# Patient Record
Sex: FEMALE | Race: WHITE | URBAN - METROPOLITAN AREA
[De-identification: names, ages, dates, MRNs, and addresses within clinical notes are randomized per-mention and may not be internally consistent; named-entity substitution may affect disease eponyms.]

---

## 2024-10-16 ENCOUNTER — OFFICE VISIT (OUTPATIENT)
Dept: URGENT CARE | Age: 54
End: 2024-10-16
Payer: COMMERCIAL

## 2024-10-16 VITALS
HEIGHT: 66 IN | DIASTOLIC BLOOD PRESSURE: 94 MMHG | TEMPERATURE: 98.1 F | OXYGEN SATURATION: 99 % | SYSTOLIC BLOOD PRESSURE: 139 MMHG | WEIGHT: 165 LBS | HEART RATE: 62 BPM | BODY MASS INDEX: 26.52 KG/M2 | RESPIRATION RATE: 16 BRPM

## 2024-10-16 DIAGNOSIS — S63.601A SPRAIN OF RIGHT THUMB, UNSPECIFIED SITE OF DIGIT, INITIAL ENCOUNTER: Primary | ICD-10-CM

## 2024-10-16 RX ORDER — METHYLPREDNISOLONE ACETATE 80 MG/ML
80 INJECTION, SUSPENSION INTRA-ARTICULAR; INTRALESIONAL; INTRAMUSCULAR; SOFT TISSUE ONCE
Status: COMPLETED | OUTPATIENT
Start: 2024-10-16 | End: 2024-10-16

## 2024-10-16 NOTE — PATIENT INSTRUCTIONS
You received a anti-inflammatory steroid injection here in the office.  Ice can be used for pain relief by placing it on the area pain for 15-20 minutes, 2-3 times per day.  Range of motion stretches and exercises.    If symptoms not improving within the next week, follow-up for further testing and possible x-ray.

## 2024-10-16 NOTE — PROGRESS NOTES
HPI  Patient was at work using a grinding wheel that was going upwards, thumb was pushing an object into it and twisted off.  Felt sore the rest the day yesterday.  Took ibuprofen and resting it.  No swelling or bruising.  Able to move it well.  No significant pain.  No similar injury in the past.  Right-hand-dominant.  No laceration.  Physical Exam  Constitutional: vital signs reviewed. Well developed, well nourished. patient alert and patient without distress.   Psych: Normal mood and affect  Skin: Normal skin color and pigmentation, normal skin turgor, and no rash.  Cardiovascular: No edema in the extremities. Normal skin color/perfusion.   Pulmonary: Skin without cyanosis. Patient without respiratory distress. Speaking in full sentences.  Musculoskeletal: Normal gait and stance, balance and coordination without gross deficit.  Right thumb with no gross deformity.  No tenderness from the carpal bones to tip of thumb.  Normal range of motion.  Neurovasc intact.  No tendon deficit.  No ligament laxity.  Negative Tinel.    _____________MEDCO-14 Physician's Report of Work Ability [facility]-[id number]_________________      Injured Worker:  Date of injury: Claim number:   Rosa Mcknight  10/15/24      Date of last appointment /examination: Date of this appointment /examination:  Date of next appointment /examination:       10/16/24        Employer name: Injured worker's position of employment at time of injury:            MEDCO-14 submission   1.  Please select one of the following options: I have never completed a MEDCO-14. Proceed to section 2.         Employment/Occupation (Complete this section and proceed to section 3)  2. Updates made to Employment/Occupation Section: Yes    Have you reviewed the description of the injured worker's job held on the date of the injury (former position of employment)? Yes, job description provided by: Injured worker       Work Status/Injured worker's capabilities.   3a. Updates made to work  "status/injured worker's capabilities: Yes    Does the injured worker have any physical or health restrictions related to allowed conditions in the claim? No, the injured worker is released to work as of the date of this exam. Proceed to Section 8     3b. If restrictions, can the injured worker return to full duties of his/her job held on the date of injury (former position of employment)?     Yes.     The injured worker is released to work as of the date of this exam. Proceed to Section 8.   3c. Please indicate which of the activities listed below the injured worker can perform (even if the response to 3B is No.)     The injured worker is not released to the former position of employment but may return to available and appropriate work with restrictions, the possible return to work date: .        Please indicate the following: Never, Occasionally, Frequently, Continuously    Activity   Bend:   Squat / kneel:   Twist / turn:   Climb:  Reach above shoulder:   Type / Keyboard:  Work w/cold substances:   Work w/hot substances:       Lifting/Carrying                                      Pushing/pulling  0 - 10 lbs:   11 - 20 lbs:   21 - 40 lbs:   41 - 60 lbs:   61 - 100 lbs:  0 - 25 lbs:   26 - 40 lbs:   41 -  60 lbs:   61 - 100 lbs:   100 + lbs:        How many total hours can the injured worker work?       In an eight-hour workday, how many total hours is the injured worker potentially able to work?      Sit:   Walk:   Stand:           Does the injured worker have any functional restrictions based only on allowed psychological conditions?     *Note: If Yes is indicated please reference the MEDCO-16 as needed.     Additionally, in this space please provide any additional information addressing the  injured worker's capabilities and/or job accommodations which may not be addressed above.        Disability information   4a.  *Note: If 3B above is \"No\" or dates updated - all 4A fields, including site/location if applicable " must be completed    Updates:     Complete the chart below and furnish the narrative description of the diagnosis(es), site/location, if applicable, and ICD code for the conditions being treated due to the work- related injury/disease.  Please indicate if the condition is preventing the injured worker from returning to job duties he/she held on the date of injury.       Narrative description of the work-related allowed condition Site/Location if applicable ICD Code Is the condition preventing full duty release to the job injured worker held on date of injury?                                             4B. List all other relevant conditions that impact treatment of the conditions listed above (e.g., co-morbidities or not yet allowed conditions).        Clinical findings:    5. You can reference office notes in lieu of writing clinical findings below.  Updates:     The injured worker is progressing:     Provide your clinical and objective findings supporting your medical opinion outlined on this form.  List barriers to return to work and reason, for the injured worker's delay in recovery.        Maximum medical improvement (MMI):  6. Updates:     MMI is a treatment plateau (static or well-stabilized) at which no fundamental       functional or physiological change can be expected within reasonable medical       probability, in spite of continuing medical or rehabilitative procedures.     Has the work-related injury(s) or occupational disease reached MMI based on the definition above?     *Note: An injured worker may need supportive treatment to maintain his or her level of function after reaching MMI. Thus, periodic medical treatment may still be requested and provided.      Vocational rehabilitation:   7. Updates:     Vocational rehabilitation is an individualized and voluntary program for an eligible injured worker who needs assistance in safely returning to work or in retaining employment.  This program can be  tailored around an injured worker's restrictions and may provide job seeking skills or necessary retraining. Is the injured worker a candidate for vocational rehabilitation services focusing on return to work?      Treating physician signature - mandatory:  8. I certify the information on this form is correct to the best of my knowledge. I am aware that any person who knowingly makes a false statement, misrepresentation, concealment of fact or any other act of fraud to obtain payment as provided by [facility], or who knowingly accepts payment to which that person is not entitled, is subject to felony criminal prosecution and may be punished, under appropriate criminal provisions. by a fine or imprisonment or both.     Treating physician's name (please print legibly): Riley Ng DO  [facility] provider (Ruth) number:    Complete Address, Telephone, Fax number and Date:      Treating physician's signature: Riley Ng DO                  Patient disposition: Home   Opt out